# Patient Record
Sex: MALE | NOT HISPANIC OR LATINO | Employment: FULL TIME | URBAN - NONMETROPOLITAN AREA
[De-identification: names, ages, dates, MRNs, and addresses within clinical notes are randomized per-mention and may not be internally consistent; named-entity substitution may affect disease eponyms.]

---

## 2021-01-28 ENCOUNTER — OFFICE VISIT (OUTPATIENT)
Dept: ORTHOPEDICS | Facility: OTHER | Age: 41
End: 2021-01-28
Attending: PODIATRIST
Payer: COMMERCIAL

## 2021-01-28 DIAGNOSIS — Z87.81 S/P ORIF (OPEN REDUCTION INTERNAL FIXATION) FRACTURE: Primary | ICD-10-CM

## 2021-01-28 DIAGNOSIS — Z98.890 S/P ORIF (OPEN REDUCTION INTERNAL FIXATION) FRACTURE: Primary | ICD-10-CM

## 2021-01-28 DIAGNOSIS — Z09 POSTOP CHECK: Primary | ICD-10-CM

## 2021-01-28 PROCEDURE — 99024 POSTOP FOLLOW-UP VISIT: CPT | Performed by: PODIATRIST

## 2021-01-28 NOTE — PROGRESS NOTES
Patient is here for follow up on his right foot.  Caitlin Antoine LPN .....................1/28/2021 8:10 AM

## 2021-01-28 NOTE — PROGRESS NOTES
Visit Date:   2021      Cassius is here 6 days out from ORIF of calcaneus, doing well, no acute concern.  Denies signs of infection or blood clot.      PHYSICAL EXAMINATION:  Surgical site is well coapted.  Given the extent of swelling, sutures were left in.  He is only 6 days out.  No overt clinical concerns otherwise.  He has normal postoperative edema.  CMS is intact.  No signs of infection.      ASSESSMENT:  Status post open reduction internal fixation of calcaneus.      PLAN:  I discussed progression of treatment.  Splint was changed.  He was put in a short-leg cast today.  He should continue to ice and elevate.  I will see him back in 2 weeks for suture removal and progression into a boot.  We will get x-rays, 2 views, of the heel at that time.         HIREN ALLISON DPM             D: 2021   T: 2021   MT: NORMA      Name:     CASSIUS GOLD   MRN:      [id number]        Account:      [id number]   :      1980           Visit Date:   2021      Document: [id number]

## 2021-02-10 DIAGNOSIS — Z87.81 S/P ORIF (OPEN REDUCTION INTERNAL FIXATION) FRACTURE: Primary | ICD-10-CM

## 2021-02-10 DIAGNOSIS — Z98.890 S/P ORIF (OPEN REDUCTION INTERNAL FIXATION) FRACTURE: Primary | ICD-10-CM

## 2021-02-11 ENCOUNTER — HOSPITAL ENCOUNTER (OUTPATIENT)
Dept: GENERAL RADIOLOGY | Facility: OTHER | Age: 41
End: 2021-02-11
Attending: PODIATRIST
Payer: COMMERCIAL

## 2021-02-11 ENCOUNTER — OFFICE VISIT (OUTPATIENT)
Dept: ORTHOPEDICS | Facility: OTHER | Age: 41
End: 2021-02-11
Attending: PODIATRIST
Payer: COMMERCIAL

## 2021-02-11 DIAGNOSIS — Z87.81 S/P ORIF (OPEN REDUCTION INTERNAL FIXATION) FRACTURE: ICD-10-CM

## 2021-02-11 DIAGNOSIS — Z98.890 S/P ORIF (OPEN REDUCTION INTERNAL FIXATION) FRACTURE: ICD-10-CM

## 2021-02-11 DIAGNOSIS — Z09 POSTOPERATIVE FOLLOW-UP: Primary | ICD-10-CM

## 2021-02-11 PROCEDURE — 99024 POSTOP FOLLOW-UP VISIT: CPT | Performed by: PODIATRIST

## 2021-02-11 PROCEDURE — 73650 X-RAY EXAM OF HEEL: CPT | Mod: RT

## 2021-02-11 NOTE — PROGRESS NOTES
Patient is here for follow up on his right foot.  Caitlin Antoine LPN .....................2/11/2021 3:13 PM

## 2021-02-11 NOTE — PROGRESS NOTES
Visit Date:   2021      Cassius is here 3 weeks out from ORIF calcaneus, doing well, no acute concern.      PHYSICAL EXAMINATION:  Surgical site healed.  Sutures removed, Steri-Strips applied.  CMS is intact.  No concerns clinically.      IMAGING:  Two views of the heel taken demonstrated intact well positioned hardware, good alignment, good reduction.      ASSESSMENT:  Status post ORIF calcaneus.      PLAN:  I discussed progression of treatment.  The patient is doing well.  Continue to be nonweightbearing on this.  Can get his foot wet starting tomorrow now that sutures are out.  He was put in a boot today, nonweightbearing.  Follow-up in 4 weeks, repeat heel x-rays.         HIREN ALLISON DPM             D: 2021   T: 2021   MT: JASON      Name:     CASSIUS GOLD   MRN:      [id number]        Account:      [id number]   :      1980           Visit Date:   2021      Document: [id number]

## 2021-03-10 DIAGNOSIS — Z98.890 S/P ORIF (OPEN REDUCTION INTERNAL FIXATION) FRACTURE: Primary | ICD-10-CM

## 2021-03-10 DIAGNOSIS — Z87.81 S/P ORIF (OPEN REDUCTION INTERNAL FIXATION) FRACTURE: Primary | ICD-10-CM

## 2021-03-11 ENCOUNTER — OFFICE VISIT (OUTPATIENT)
Dept: ORTHOPEDICS | Facility: OTHER | Age: 41
End: 2021-03-11
Attending: PODIATRIST
Payer: COMMERCIAL

## 2021-03-11 ENCOUNTER — HOSPITAL ENCOUNTER (OUTPATIENT)
Dept: GENERAL RADIOLOGY | Facility: OTHER | Age: 41
End: 2021-03-11
Attending: PODIATRIST
Payer: COMMERCIAL

## 2021-03-11 DIAGNOSIS — Z87.81 S/P ORIF (OPEN REDUCTION INTERNAL FIXATION) FRACTURE: ICD-10-CM

## 2021-03-11 DIAGNOSIS — Z87.81 S/P ORIF (OPEN REDUCTION INTERNAL FIXATION) FRACTURE: Primary | ICD-10-CM

## 2021-03-11 DIAGNOSIS — Z98.890 S/P ORIF (OPEN REDUCTION INTERNAL FIXATION) FRACTURE: ICD-10-CM

## 2021-03-11 DIAGNOSIS — Z98.890 S/P ORIF (OPEN REDUCTION INTERNAL FIXATION) FRACTURE: Primary | ICD-10-CM

## 2021-03-11 PROCEDURE — 99024 POSTOP FOLLOW-UP VISIT: CPT | Performed by: PODIATRIST

## 2021-03-11 PROCEDURE — 73650 X-RAY EXAM OF HEEL: CPT | Mod: RT

## 2021-03-11 NOTE — PROGRESS NOTES
Patient is here for follow up on his right calcaneal ORIF.   Caitlin Antoine LPN .....................3/11/2021 12:11 PM

## 2021-03-11 NOTE — PROGRESS NOTES
Visit Date:   2021      HISTORY OF PRESENT ILLNESS:  Cassius is here 7 weeks out from ORIF of calcaneus, doing quite well.  No acute concerns.  Has been nonweightbearing up until this point.  Denies signs of infection or blood clot.      PHYSICAL EXAMINATION:  Surgical site well-healed.  No signs of infection.  Good morphology and shape of the heel.  Good alignment and no concerns clinically.      IMAGING:  Two views of the heel demonstrated intact well positioned hardware, interval healing, good alignment, joint seems to be intact.  No concerns radiographically.      ASSESSMENT:  Status post open reduction, internal fixation of the right calcaneus.      PLAN:  I discussed progression of treatment.  The patient is released to progress weightbearing in his boot only at this point and slowly as able to tolerate.  I will see him back in 4 weeks and repeat x-rays.  Continue no work, anticipate likely close to 2 to 3 months prior to return without restriction.  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  I do not anticipate him being able to do this here for at least a couple of months.         HIREN ALLISON DPM             D: 2021   T: 2021   MT: TERI      Name:     CASSIUS GOLD   MRN:      [id number]        Account:      [id number]   :      1980           Visit Date:   2021      Document: [id number]

## 2021-04-07 DIAGNOSIS — Z98.890 S/P ORIF (OPEN REDUCTION INTERNAL FIXATION) FRACTURE: Primary | ICD-10-CM

## 2021-04-07 DIAGNOSIS — Z87.81 S/P ORIF (OPEN REDUCTION INTERNAL FIXATION) FRACTURE: Primary | ICD-10-CM

## 2021-04-08 ENCOUNTER — HOSPITAL ENCOUNTER (OUTPATIENT)
Dept: ULTRASOUND IMAGING | Facility: OTHER | Age: 41
End: 2021-04-08
Attending: PODIATRIST
Payer: COMMERCIAL

## 2021-04-08 ENCOUNTER — HOSPITAL ENCOUNTER (OUTPATIENT)
Dept: GENERAL RADIOLOGY | Facility: OTHER | Age: 41
End: 2021-04-08
Attending: PODIATRIST
Payer: COMMERCIAL

## 2021-04-08 ENCOUNTER — OFFICE VISIT (OUTPATIENT)
Dept: ORTHOPEDICS | Facility: OTHER | Age: 41
End: 2021-04-08
Attending: PODIATRIST
Payer: COMMERCIAL

## 2021-04-08 DIAGNOSIS — M79.661 RIGHT CALF PAIN: ICD-10-CM

## 2021-04-08 DIAGNOSIS — Z09 POSTOP CHECK: ICD-10-CM

## 2021-04-08 DIAGNOSIS — Z98.890 S/P ORIF (OPEN REDUCTION INTERNAL FIXATION) FRACTURE: ICD-10-CM

## 2021-04-08 DIAGNOSIS — Z87.81 S/P ORIF (OPEN REDUCTION INTERNAL FIXATION) FRACTURE: ICD-10-CM

## 2021-04-08 DIAGNOSIS — M79.661 RIGHT CALF PAIN: Primary | ICD-10-CM

## 2021-04-08 PROCEDURE — 99024 POSTOP FOLLOW-UP VISIT: CPT | Performed by: PODIATRIST

## 2021-04-08 PROCEDURE — 93971 EXTREMITY STUDY: CPT | Mod: RT

## 2021-04-08 PROCEDURE — 73650 X-RAY EXAM OF HEEL: CPT | Mod: RT

## 2021-04-08 NOTE — PROGRESS NOTES
Patient is here for follow up on his right calcaneal ORIF.   Caitlin Antoine LPN .....................4/8/2021 12:17 PM

## 2021-04-08 NOTE — PROGRESS NOTES
Visit Date:   2021      SUBJECTIVE:  Cassius is here 11 weeks out from ORIF of calcaneus, doing well, no acute concern.      OBJECTIVE:  On examination, does have significant swelling, some mild tenderness to calf squeeze.  Otherwise, no signs of infection.  He is doing well, weightbearing in his boot.  Surgical site well-healed.        IMAGING:  Two views of heel demonstrated intact well-positioned hardware, interval healing and alignment is maintained.      ASSESSMENT:  Status post ORIF of calcaneus, concern for DVT.      PLAN:  I discussed condition and treatment with the patient today.  Ultrasound ordered to rule out DVT.  Otherwise, progress weightbearing, can progress into a shoe.  Start therapy, strength and range of motion, gait training.  Followup in 4 weeks and release from there if doing well.         HIREN ALLISON DPM             D: 2021   T: 2021   MT: RYAN      Name:     CASSIUS GOLD   MRN:      [id number]        Account:      [id number]   :      1980           Visit Date:   2021      Document: [id number]

## 2021-04-28 DIAGNOSIS — Z87.81 S/P ORIF (OPEN REDUCTION INTERNAL FIXATION) FRACTURE: Primary | ICD-10-CM

## 2021-04-28 DIAGNOSIS — Z98.890 S/P ORIF (OPEN REDUCTION INTERNAL FIXATION) FRACTURE: Primary | ICD-10-CM

## 2021-04-29 ENCOUNTER — HOSPITAL ENCOUNTER (OUTPATIENT)
Dept: GENERAL RADIOLOGY | Facility: OTHER | Age: 41
End: 2021-04-29
Attending: PODIATRIST
Payer: COMMERCIAL

## 2021-04-29 ENCOUNTER — OFFICE VISIT (OUTPATIENT)
Dept: ORTHOPEDICS | Facility: OTHER | Age: 41
End: 2021-04-29
Attending: PODIATRIST
Payer: COMMERCIAL

## 2021-04-29 DIAGNOSIS — Z87.81 S/P ORIF (OPEN REDUCTION INTERNAL FIXATION) FRACTURE: ICD-10-CM

## 2021-04-29 DIAGNOSIS — Z87.81 S/P ORIF (OPEN REDUCTION INTERNAL FIXATION) FRACTURE: Primary | ICD-10-CM

## 2021-04-29 DIAGNOSIS — Z98.890 S/P ORIF (OPEN REDUCTION INTERNAL FIXATION) FRACTURE: ICD-10-CM

## 2021-04-29 DIAGNOSIS — Z98.890 S/P ORIF (OPEN REDUCTION INTERNAL FIXATION) FRACTURE: Primary | ICD-10-CM

## 2021-04-29 PROCEDURE — 73650 X-RAY EXAM OF HEEL: CPT | Mod: RT

## 2021-04-29 PROCEDURE — 99213 OFFICE O/P EST LOW 20 MIN: CPT | Performed by: PHYSICIAN ASSISTANT

## 2021-04-29 NOTE — PROGRESS NOTES
Patient is here for follow up on his right foot,.  Caitlin Antoine LPN .....................4/29/2021 11:59 AM

## 2021-04-30 NOTE — PROGRESS NOTES
Visit Date: 2021    Cassius comes in today.  He is about 14 weeks out from ORIF of right calcaneus, doing quite well.  He states that he has been walking around in a regular shoe and is not having a lot of discomfort when he is doing that.    PHYSICAL EXAMINATION:  MUSCULOSKELETAL:  On exam today, the incisions are well healed.  There are no signs of infection.  He has no calf pain.  He does have some residual numbness in his great toe.  Other than that, no other issues.  Good range of motion and no pain with palpation to the incision sites.      X-rays of the heel, 2 views, show well-positioned hardware and satisfactory alignment.    ASSESSMENT:  Status post open reduction, internal fixation of the right calcaneus, doing well.      PLAN:  At this time, we will release the patient to continue his activities as he can tolerate, continue walking in regular supportive shoes and he can go back to work without restrictions.  He will follow up on a p.r.n. basis.    Manuel Romero DPM    As Dictated by CHERRY GALEANO        D: 2021   T: 2021   MT: [email or web address]    Name:     CASSIUS GOLD  MRN:      [id number]        Account:    [id number]   :      1980           Visit Date: 2021     Document: [id number]